# Patient Record
Sex: FEMALE | Race: WHITE | NOT HISPANIC OR LATINO | Employment: OTHER | URBAN - METROPOLITAN AREA
[De-identification: names, ages, dates, MRNs, and addresses within clinical notes are randomized per-mention and may not be internally consistent; named-entity substitution may affect disease eponyms.]

---

## 2018-09-13 ENCOUNTER — TELEPHONE (OUTPATIENT)
Dept: OBSTETRICS AND GYNECOLOGY | Age: 78
End: 2018-09-13

## 2018-10-02 ENCOUNTER — OFFICE VISIT (OUTPATIENT)
Dept: OBSTETRICS AND GYNECOLOGY | Age: 78
End: 2018-10-02

## 2018-10-02 VITALS
HEIGHT: 61 IN | BODY MASS INDEX: 27 KG/M2 | WEIGHT: 143 LBS | DIASTOLIC BLOOD PRESSURE: 72 MMHG | SYSTOLIC BLOOD PRESSURE: 122 MMHG

## 2018-10-02 DIAGNOSIS — Z86.711 HISTORY OF PULMONARY EMBOLISM: ICD-10-CM

## 2018-10-02 DIAGNOSIS — Z00.00 ENCOUNTER FOR MEDICAL EXAMINATION TO ESTABLISH CARE: Primary | ICD-10-CM

## 2018-10-02 DIAGNOSIS — Z00.00 ANNUAL PHYSICAL EXAM: ICD-10-CM

## 2018-10-02 PROCEDURE — G0101 CA SCREEN;PELVIC/BREAST EXAM: HCPCS | Performed by: OBSTETRICS & GYNECOLOGY

## 2018-10-02 RX ORDER — ASPIRIN 81 MG/1
81 TABLET, CHEWABLE ORAL DAILY
COMMUNITY

## 2018-10-02 RX ORDER — CHLORTHALIDONE 25 MG/1
25 TABLET ORAL DAILY
COMMUNITY

## 2018-10-02 RX ORDER — ESCITALOPRAM OXALATE 10 MG/1
10 TABLET ORAL DAILY
COMMUNITY

## 2018-10-02 RX ORDER — LOVASTATIN 20 MG/1
20 TABLET ORAL NIGHTLY
COMMUNITY

## 2018-10-02 NOTE — PROGRESS NOTES
Subjective   Crystal Wadsworth is a 77 y.o. female is being seen today for   Chief Complaint   Patient presents with   • Establish Care     Former pt here to restablish care.  Last pap 2011 = negative.   PM no HRT.  Family hx of breast and colon cancer (mother and father).  Personal hx of double mastectomy(prophylactic).  Dexa 2016.  Spouse is a  and  (non profit for wounded soldiers).    .    History of Present Illness  Patient is here as a new patient for an annual check.  Is been about 5 years since I have seen her and she moved [place] and didn't see what her to physicians down there but wasn't totally satisfied with the care.  She is back visiting and just planned routine annual check today.  She is up-to-date on everything mammogram colonoscopy bone density and Pap smears.  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  She didn't find since but never found a reason for it.  Bowels and bladder work well she does exercise but she is very concerned about her weight.  She knows and she not a problem she is getting some opinions from of febrile at home that she should be less than what she is now.  So talked a long time about that about how Lynda who is very fine but she needs to maintain which she is doing.  There was a problem at home concerning attitudes etc.  Her children are fine she has a son and [place] who is  with couple children and a daughter in [place] is also  with a couple of children.  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  No other complaints  The following portions of the patient's history were reviewed and updated as appropriate: allergies, current medications, past family history, past medical history, past social history, past surgical history and problem list.    Vitals:    10/02/18 1420   BP: 122/72       PAST  MEDICAL HISTORY  Past Medical History:   Diagnosis Date   • H/O bilateral mastectomy      OB History     No data available        Past Surgical History:   Procedure Laterality Date   • COLONOSCOPY  2015    father colon cancer   • MASTECTOMY Bilateral     prophylactic(mother breast cancer, father colon cancer).      Family History   Problem Relation Age of Onset   • Breast cancer Mother    • Colon cancer Father    • No Known Problems Daughter    • No Known Problems Son      History   Smoking Status   • Former Smoker   Smokeless Tobacco   • Never Used       Current Outpatient Prescriptions:   •  apixaban (ELIQUIS) 5 MG tablet tablet, Take 5 mg by mouth Every 12 (Twelve) Hours., Disp: , Rfl:   •  aspirin 81 MG chewable tablet, Chew 81 mg Daily., Disp: , Rfl:   •  chlorthalidone (HYGROTON) 25 MG tablet, Take 25 mg by mouth Daily., Disp: , Rfl:   •  escitalopram (LEXAPRO) 10 MG tablet, Take 10 mg by mouth Daily., Disp: , Rfl:   •  lovastatin (MEVACOR) 20 MG tablet, Take 20 mg by mouth Every Night., Disp: , Rfl:     There is no immunization history on file for this patient.    Review of Systems   Constitutional: Negative for chills, fatigue, fever and unexpected weight change.   Respiratory: Negative for shortness of breath and wheezing.    Cardiovascular: Negative for chest pain.   Gastrointestinal: Negative for abdominal distention, abdominal pain, blood in stool, constipation, diarrhea and nausea.   Genitourinary: Negative for difficulty urinating, dyspareunia, dysuria, frequency, hematuria, menstrual problem, pelvic pain, urgency and vaginal discharge.   Skin: Negative for rash.       Objective   Physical Exam   Constitutional: She is oriented to person, place, and time. Vital signs are normal. She appears well-developed and well-nourished.   Neck: No thyromegaly present.   Cardiovascular: Normal rate, regular rhythm and normal heart sounds.    Pulmonary/Chest: Effort normal. Right breast exhibits no inverted nipple,  no mass, no nipple discharge, no skin change and no tenderness. Left breast exhibits no inverted nipple, no mass, no nipple discharge, no skin change and no tenderness. Breasts are symmetrical. There is no breast swelling.   Abdominal: Soft.   Genitourinary: Vagina normal and uterus normal. No breast tenderness, discharge or bleeding. Pelvic exam was performed with patient supine. No labial fusion. There is no rash, tenderness, lesion or injury on the right labia. There is no rash, tenderness, lesion or injury on the left labia. Cervix exhibits no motion tenderness, no discharge and no friability. Right adnexum displays no mass, no tenderness and no fullness. Left adnexum displays no mass, no tenderness and no fullness.   Neurological: She is alert and oriented to person, place, and time.   Psychiatric: She has a normal mood and affect.   Vitals reviewed.        Assessment/Plan   Crystal was seen today for establish care.    Diagnoses and all orders for this visit:    Encounter for medical examination to establish care    Annual physical exam    History of pulmonary embolism        Normal exam today.  No Pap smear today.  Again everything is up-to-date as mentioned above and we talked abated about diet and excise.  Come back in a couple of years if in town